# Patient Record
(demographics unavailable — no encounter records)

---

## 2024-10-09 NOTE — HISTORY OF PRESENT ILLNESS
[Patient reported PAP Smear was normal] : Patient reported PAP Smear was normal [Tubal Occlusion] : has had a tubal occlusion [N] : Patient is not sexually active [Y] : Positive pregnancy history [Previously active] : previously active [TextBox_19] : never [PapSmeardate] : 3-4 yrs ago [LMPDate] : 10/2/24 [MensesFreq] : irregular [MensesLength] : 5 [MensesAmount] : heavy [PGHxTotal] : 3 [[facility]el[facility]] : 3 [[facility]] : 3 [FreeTextEntry1] : c/s x3 [TextBox_6] : 10/1/24 [TextBox_9] : 11 [TextBox_13] : irregular [TextBox_15] : 5

## 2024-10-09 NOTE — PHYSICAL EXAM
[Appropriately responsive] : appropriately responsive [Alert] : alert [No Acute Distress] : no acute distress [Soft] : soft [Non-tender] : non-tender [Non-distended] : non-distended [No HSM] : No HSM [No Lesions] : no lesions [No Mass] : no mass [Oriented x3] : oriented x3 [Labia Majora] : normal [Labia Minora] : normal [Discharge] : a  ~M vaginal discharge was present [Normal] : normal [Anteversion] : anteverted [Uterine Adnexae] : non-palpable [FreeTextEntry6] : no masses, lymph nodes, tenderness, nipple discharge or skin changes  dense breast b/l [Tenderness] : nontender [Enlarged ___ wks] : not enlarged [Mass ___ cm] : no uterine mass was palpated